# Patient Record
Sex: FEMALE | Race: WHITE | URBAN - METROPOLITAN AREA
[De-identification: names, ages, dates, MRNs, and addresses within clinical notes are randomized per-mention and may not be internally consistent; named-entity substitution may affect disease eponyms.]

---

## 2022-01-10 ENCOUNTER — TELEPHONE ENCOUNTER (OUTPATIENT)
Dept: URBAN - METROPOLITAN AREA CLINIC 96 | Facility: CLINIC | Age: 77
End: 2022-01-10

## 2022-01-12 ENCOUNTER — OFFICE VISIT (OUTPATIENT)
Dept: URBAN - METROPOLITAN AREA TELEHEALTH 2 | Facility: TELEHEALTH | Age: 77
End: 2022-01-12

## 2022-01-12 ENCOUNTER — DASHBOARD ENCOUNTERS (OUTPATIENT)
Age: 77
End: 2022-01-12

## 2022-01-12 RX ORDER — POTASSIUM CHLORIDE 1.5 G/1
POWDER, FOR SOLUTION ORAL
Qty: 0 | Refills: 0 | Status: ACTIVE | COMMUNITY
Start: 1900-01-01

## 2022-01-12 NOTE — HPI-TODAY'S VISIT:
Patient is a 76-year-old female referred by Dr. Sheri Diaz for a GI telehealth follow-up visit and a copy of this note be sent to the referring provider.  Patient was last seen in the office on May 4, 2020.  She had seen my partner Dr. Serrano in the past for gluten intolerance related symptoms and had an upper endoscopy with my other partner Dr. LAIRD on December 14, 2018 for dysphagia and heartburn.  Biopsies revealed mild chronic inactive gastritis but no H. pylori.  Acute esophagitis was seen with neutrophilic infiltrate typically seen in Candida but no organisms were seen.  Prior to that she saw our PA Christian and complained of dysphagia.  I saw her back in May 2017 at which time she had negative celiac genetics test for celiac disease but had a positive [employer] test for Crohn's disease.  Work-up then showed a negative CT enterography and normal colonoscopy done in July 2013.  Patient had a strong family history of celiac disease and a positive TTG but no evidence of Crohn's disease endoscopically in 2016 and negative celiac genetics done by [employer].  Patient was diagnosed with Sjogren's disease and had complained of having watery diarrhea.  She used Imodium for this.  She was seeing a neurologist for numbness of the hands and has cervical stenosis.  I had recommended CT scan due to her ongoing symptoms as well as some weight loss.  I also recommended stool studies to look for malabsorption.  She did not appear to do the CT scan.  Prior CT scan done on August 26, 2016 showed normal small bowel no hyperenhancement to suggest Crohn's disease.  Patient did have a negative H. pylori breath test done in the office on May 17, 2017.

## 2022-02-10 ENCOUNTER — TELEPHONE ENCOUNTER (OUTPATIENT)
Dept: URBAN - METROPOLITAN AREA CLINIC 23 | Facility: CLINIC | Age: 77
End: 2022-02-10

## 2022-05-02 ENCOUNTER — OFFICE VISIT (OUTPATIENT)
Dept: URBAN - METROPOLITAN AREA CLINIC 96 | Facility: CLINIC | Age: 77
End: 2022-05-02